# Patient Record
Sex: FEMALE | URBAN - METROPOLITAN AREA
[De-identification: names, ages, dates, MRNs, and addresses within clinical notes are randomized per-mention and may not be internally consistent; named-entity substitution may affect disease eponyms.]

---

## 2021-02-10 ENCOUNTER — OFFICE VISIT (OUTPATIENT)
Dept: URBAN - METROPOLITAN AREA CLINIC 29 | Facility: CLINIC | Age: 71
End: 2021-02-10
Payer: COMMERCIAL

## 2021-02-10 DIAGNOSIS — H52.4 PRESBYOPIA: Primary | ICD-10-CM

## 2021-02-10 DIAGNOSIS — H25.813 COMBINED FORMS OF AGE-RELATED CATARACT, BILATERAL: ICD-10-CM

## 2021-02-10 PROCEDURE — 92002 INTRM OPH EXAM NEW PATIENT: CPT | Performed by: OPTOMETRIST

## 2021-02-10 ASSESSMENT — VISUAL ACUITY
OD: 20/25
OS: 20/40

## 2021-02-10 ASSESSMENT — INTRAOCULAR PRESSURE
OS: 15
OD: 15

## 2021-02-10 NOTE — IMPRESSION/PLAN
Impression: Combined forms of age-related cataract, bilateral: H25.813. Condition: will continue to monitor. Plan: Discussed diagnosis in detail with patient. No treatment is required at this time. Patient will update glasses RX first. Will continue to observe condition and or symptoms.

## 2023-11-01 ENCOUNTER — OFFICE VISIT (OUTPATIENT)
Dept: URBAN - METROPOLITAN AREA CLINIC 22 | Facility: CLINIC | Age: 73
End: 2023-11-01
Payer: MEDICARE

## 2023-11-01 DIAGNOSIS — H52.4 PRESBYOPIA: ICD-10-CM

## 2023-11-01 DIAGNOSIS — H25.813 COMBINED FORMS OF AGE-RELATED CATARACT, BILATERAL: ICD-10-CM

## 2023-11-01 DIAGNOSIS — H02.831 DERMATOCHALASIS OF RIGHT UPPER EYELID: Primary | ICD-10-CM

## 2023-11-01 DIAGNOSIS — H02.834 DERMATOCHALASIS OF LEFT UPPER EYELID: ICD-10-CM

## 2023-11-01 PROCEDURE — 92014 COMPRE OPH EXAM EST PT 1/>: CPT | Performed by: STUDENT IN AN ORGANIZED HEALTH CARE EDUCATION/TRAINING PROGRAM

## 2023-11-01 ASSESSMENT — VISUAL ACUITY
OD: 20/25
OS: 20/25

## 2023-11-30 ENCOUNTER — OFFICE VISIT (OUTPATIENT)
Dept: URBAN - METROPOLITAN AREA CLINIC 32 | Facility: CLINIC | Age: 73
End: 2023-11-30
Payer: MEDICARE

## 2023-11-30 DIAGNOSIS — H02.831 DERMATOCHALASIS OF RIGHT UPPER EYELID: Primary | ICD-10-CM

## 2023-11-30 DIAGNOSIS — H02.834 DERMATOCHALASIS OF LEFT UPPER EYELID: ICD-10-CM

## 2023-11-30 PROCEDURE — 92285 EXTERNAL OCULAR PHOTOGRAPHY: CPT | Performed by: OPHTHALMOLOGY

## 2023-11-30 PROCEDURE — 99204 OFFICE O/P NEW MOD 45 MIN: CPT | Performed by: OPHTHALMOLOGY

## 2023-11-30 ASSESSMENT — INTRAOCULAR PRESSURE
OD: 14
OS: 15

## 2023-12-07 ENCOUNTER — TECH ONLY (OUTPATIENT)
Dept: URBAN - METROPOLITAN AREA CLINIC 23 | Facility: CLINIC | Age: 73
End: 2023-12-07
Payer: MEDICARE

## 2023-12-07 PROCEDURE — 92081 LIMITED VISUAL FIELD XM: CPT | Performed by: OPHTHALMOLOGY

## 2024-02-09 ENCOUNTER — SURGERY (OUTPATIENT)
Dept: URBAN - METROPOLITAN AREA SURGERY 11 | Facility: SURGERY | Age: 74
End: 2024-02-09
Payer: MEDICARE

## 2024-02-22 ENCOUNTER — POST-OPERATIVE VISIT (OUTPATIENT)
Dept: URBAN - METROPOLITAN AREA CLINIC 32 | Facility: CLINIC | Age: 74
End: 2024-02-22
Payer: MEDICARE

## 2024-02-22 DIAGNOSIS — Z48.89 ENCOUNTER FOR OTHER SPECIFIED SURGICAL AFTERCARE: Primary | ICD-10-CM

## 2024-02-22 PROCEDURE — 99024 POSTOP FOLLOW-UP VISIT: CPT | Performed by: OPHTHALMOLOGY

## 2024-02-22 ASSESSMENT — INTRAOCULAR PRESSURE
OD: 19
OS: 17